# Patient Record
Sex: MALE | Race: WHITE | NOT HISPANIC OR LATINO | Employment: FULL TIME | URBAN - METROPOLITAN AREA
[De-identification: names, ages, dates, MRNs, and addresses within clinical notes are randomized per-mention and may not be internally consistent; named-entity substitution may affect disease eponyms.]

---

## 2017-03-03 ENCOUNTER — ALLSCRIPTS OFFICE VISIT (OUTPATIENT)
Dept: OTHER | Facility: OTHER | Age: 42
End: 2017-03-03

## 2017-03-09 ENCOUNTER — ALLSCRIPTS OFFICE VISIT (OUTPATIENT)
Dept: RADIOLOGY | Facility: CLINIC | Age: 42
End: 2017-03-09
Payer: COMMERCIAL

## 2017-03-21 ENCOUNTER — GENERIC CONVERSION - ENCOUNTER (OUTPATIENT)
Dept: OTHER | Facility: OTHER | Age: 42
End: 2017-03-21

## 2017-04-12 ENCOUNTER — ALLSCRIPTS OFFICE VISIT (OUTPATIENT)
Dept: OTHER | Facility: OTHER | Age: 42
End: 2017-04-12

## 2017-05-23 ENCOUNTER — ALLSCRIPTS OFFICE VISIT (OUTPATIENT)
Dept: RADIOLOGY | Facility: CLINIC | Age: 42
End: 2017-05-23
Payer: COMMERCIAL

## 2017-05-23 PROCEDURE — 77002 NEEDLE LOCALIZATION BY XRAY: CPT | Performed by: ANESTHESIOLOGY

## 2017-05-30 ENCOUNTER — GENERIC CONVERSION - ENCOUNTER (OUTPATIENT)
Dept: OTHER | Facility: OTHER | Age: 42
End: 2017-05-30

## 2018-01-11 NOTE — RESULT NOTES
Message   Recorded as Task   Date: 03/15/2017 12:59 PM, Created By: Chang Lemons   Task Name: Follow Up   Assigned To: Chang Lemons   Regarding Patient: Ynes Poe, Status: Active   Comment:    Mirlande Blanton - 15 Mar 2017 12:59 PM     TASK CREATED  F/u procedure-R L4-L5 MBB on 3/9/17  Pt reports 0% improvement in lower R back pain that radiates to R hip and knee  Describes his back pain as an ache and his R hip pain as a constant burning, but when he bends it changes to a stinging  Rates his pain as a 3-4/10  Unsure of next step  Has no f/u appoint     Ioana Rayo - 63 Mar 2017 1:54 PM     TASK REPLIED TO: Previously Assigned To Ioana Rayo  ok sched ov   Mirlande Blanton - 15 Mar 2017 2:00 PM     TASK REASSIGNED: Previously Assigned To Sara Berrios - 21 Mar 2017 10:26 AM     TASK REPLIED TO: Previously Assigned To Aditi sparksm to sched 4 wk inj f-up        Signatures   Electronically signed by : Sheri Sandoval, ; Mar 21 2017 11:24AM EST                       (Author)

## 2018-01-13 NOTE — RESULT NOTES
Message   Recorded as Task   Date: 07/08/2016 08:06 AM, Created By: Jessica Hodges   Task Name: Follow Up   Assigned To: Shiela Johnson   Regarding Patient: Syeda Santiago, Status: Active   Diana Welsh - 08 Jul 2016 8:06 AM     TASK CREATED  F/u procedure-SCS Trial on 7/7/16  I spoke to wife and she said her  is having a difficult time  Has alot of site discomfort, but still has burning back and leg pain that he had prior to procedure  Dressing is dry and intact  Is trying different programs  She will give him the message to call me back  Mirlande Blanton - 08 Jul 2016 12:03 PM     TASK REASSIGNED: Previously Assigned To Raheel Cantu - 08 Jul 2016 12:11 PM     TASK REASSIGNED: Previously Assigned To Brad Phillips  F/u procedure-SCS Trial on 7/7/16  Pt is going to meet Charmaine Pearson here today at 1:45  Is having difficult getting pain coverage  Still has low back and R hip burning that he rates a 5-6/10 along with vibrating abd muscles  No fever  Dressing is dry and intact  Has been trying different programs without good results  Is taking antibiotic as prescribes and has f/u appoint for stim removalRogers Shames - 08 Jul 2016 1:11 PM     TASK REPLIED TO: Previously Assigned To Nicole Ott MD AWARE        Verified Results  (1) APTT [id number] 02:25PM Val Files     Test Name Result Flag Reference   PARTIAL THROMBOPLASTIN$TIME, ACTIVATED 26 sec  22-34   This test has not been validated for monitoring  unfractionated heparin therapy  For testing that  is validated for this type of therapy, please refer  to the Heparin Anti-Xa assay (test code 61816)  For additional information, please refer to  http://education  Alliqua/faq/VGP712  (This link is being provided for   informational/educational purposes only )     (1) CBC/PLT/DIFF [id number] 02:25PM Val Files     Test Name Result Flag Reference   WHITE BLOOD CELL COUNT 7 1 Thousand/uL  3 8-10 8   RED BLOOD CELL COUNT 5 20 Million/uL  4 20-5 80   HEMOGLOBIN 15 1 g/dL  13 2-17 1   HEMATOCRIT 45 2 %  38 5-50 0   MCV 87 0 fL  80 0-100 0   MCH 29 1 pg  27 0-33 0   MCHC 33 5 g/dL  32 0-36 0   RDW 14 2 %  11 0-15 0   PLATELET COUNT 973 Thousand/uL  140-400   MPV 6 7 fL L 7 5-11 5   ABSOLUTE NEUTROPHILS 4381 cells/uL  5318-7320   ABSOLUTE LYMPHOCYTES 2173 cells/uL  850-3900   ABSOLUTE MONOCYTES 419 cells/uL  200-950   ABSOLUTE EOSINOPHILS 92 cells/uL     ABSOLUTE BASOPHILS 36 cells/uL  0-200   NEUTROPHILS 61 7 %     LYMPHOCYTES 30 6 %     MONOCYTES 5 9 %     EOSINOPHILS 1 3 %     BASOPHILS 0 5 %     WE RECEIVED YOUR HANDWRITTEN TEST ORDER AND  PERFORMED A CBC (INCLUDES HEMOGRAM, PLATELET  AND DIFFERENTIAL)  IF THIS IS NOT WHAT YOU  INTENDED TO ORDER, PLEASE CONTACT YOUR LOCAL   IMMEDIATELY SO   THAT WE CAN ADJUST OUR BILLING APPROPRIATELY  YOU MAY ALSO INQUIRE ABOUT ALTERNATIVE OR   ADDITIONAL TESTING  (1) COMPREHENSIVE METABOLIC PANEL [id number] 99:78WH Delmer Pae     Test Name Result Flag Reference   GLUCOSE 98 mg/dL  65-99   Fasting reference interval   UREA NITROGEN (BUN) 18 mg/dL  7-25   CREATININE 0 93 mg/dL  0 60-1 35   eGFR NON-AFR   AMERICAN 101 mL/min/1 73m2  > OR = 60   eGFR AFRICAN AMERICAN 117 mL/min/1 73m2  > OR = 60   BUN/CREATININE RATIO   9-04   NOT APPLICABLE (calc)   SODIUM 140 mmol/L  135-146   POTASSIUM 4 4 mmol/L  3 5-5 3   CHLORIDE 104 mmol/L     CARBON DIOXIDE 23 mmol/L  19-30   CALCIUM 9 8 mg/dL  8 6-10 3   PROTEIN, TOTAL 7 7 g/dL  6 1-8 1   ALBUMIN 4 8 g/dL  3 6-5 1   GLOBULIN 2 9 g/dL (calc)  1 9-3 7   ALBUMIN/GLOBULIN RATIO 1 7 (calc)  1 0-2 5   BILIRUBIN, TOTAL 0 4 mg/dL  0 2-1 2   ALKALINE PHOSPHATASE 108 U/L     AST 27 U/L  10-40   ALT 46 U/L  9-46     (1) PT WITH INR [id number] 02:25PM Delmer Pae   REPORT COMMENT:  FASTING:NO     Test Name Result Flag Reference   INR 1 0     Reference Range 0 9-1 1  Moderate-intensity Warfarin Therapy 2 0-3 0  Higher-intensity Warfarin Therapy   3 0-4 0   PT 10 2 sec  9 0-11 5   For more information on this test, go to:  http://In[employer]/faq/SAB951       Plan  Chronic lumbar radiculopathy, Lower back pain, Lumbar facet arthropathy    · XR SPINE THORACIC 2 VIEW; Status:Resulted - Requires Verification;   Done:  [id number] 03:17PM    Signatures   Electronically signed by : Jonnie Adames, ; Jul 8 2016  1:49PM EST                       (Author)

## 2018-01-14 VITALS
DIASTOLIC BLOOD PRESSURE: 90 MMHG | BODY MASS INDEX: 31.55 KG/M2 | HEIGHT: 69 IN | SYSTOLIC BLOOD PRESSURE: 138 MMHG | WEIGHT: 213 LBS | HEART RATE: 92 BPM

## 2018-01-14 VITALS
BODY MASS INDEX: 31.55 KG/M2 | SYSTOLIC BLOOD PRESSURE: 150 MMHG | HEART RATE: 92 BPM | DIASTOLIC BLOOD PRESSURE: 88 MMHG | WEIGHT: 213 LBS | HEIGHT: 69 IN

## 2018-01-17 NOTE — RESULT NOTES
Message   Recorded as Task   Date: 09/08/2016 02:53 PM, Created By: Allyssa Hammond   Task Name: Follow Up   Assigned To: Galdino Snowden   Regarding Patient: Aby Sheikh, Status: Active   CommentCarliss Barbara - 08 Sep 2016 2:53 PM     TASK CREATED  F/u procedure call completed  Pt had trigger point injection 9/1  States 25-30% relief since procedure  F/u 9/28     Mirlande Blanton - 08 Sep 2016 2:53 PM     TASK REASSIGNED: Previously Assigned To Alyssa Nuno - 08 Sep 2016 3:03 PM     TASK REPLIED TO: Previously Assigned To Porsha   Electronically signed by : Celestina Adkins, ; Sep  8 2016  3:07PM EST                       (Author)

## 2019-03-15 NOTE — RESULT NOTES
Message   Recorded as Task   Date: 05/30/2017 11:21 AM, Created By: Marilynn Brooks   Task Name: Follow Up   Assigned To: Marilynn Brooks   Regarding Patient: Junior Amezcua, Status: Active   Comment:    Cira Blantonesa - 30 May 2017 11:21 AM     TASK CREATED  F/u procedure-R piriformis injection on 5/23/17  It has been difficult for pt to determine if he got any relief, because he is being treated for a kidney stone  Has an appoint tomorrow with his urologist for a lithotripsy procedure  Will call after this procedure if pain is not better  Has no f/u appoint     Wandy Faith - 71 May 2017 12:05 PM     TASK REPLIED TO: Previously Assigned To Wandy narayanan md aware        Signatures   Electronically signed by : Susan Faustin, ; May 30 2017 12:26PM EST                       (Author) normal...

## 2021-02-25 ENCOUNTER — NURSE TRIAGE (OUTPATIENT)
Dept: OTHER | Facility: OTHER | Age: 46
End: 2021-02-25

## 2021-02-25 DIAGNOSIS — Z20.828 SARS-ASSOCIATED CORONAVIRUS EXPOSURE: Primary | ICD-10-CM

## 2021-02-25 NOTE — TELEPHONE ENCOUNTER
Reason for Disposition   [1] COVID-19 infection suspected by caller or triager AND [2] mild symptoms (cough, fever, or others) AND [5] no complications or SOB    Answer Assessment - Initial Assessment Questions  1  COVID-19 DIAGNOSIS: "Who made your Coronavirus (COVID-19) diagnosis?" "Was it confirmed by a positive lab test?" If not diagnosed by a HCP, ask "Are there lots of cases (community spread) where you live?" (See public health department website, if unsure)      community  2  COVID-19 EXPOSURE: "Was there any known exposure to COVID before the symptoms began?" [facility] Definition of close contact: within 6 feet (2 meters) for a total of 15 minutes or more over a 24-hour period  Wife tested positive yesterday, lives with wife  3  ONSET: "When did the COVID-19 symptoms start?"      3 days ago  4  WORST SYMPTOM: "What is your worst symptom?" (e g , cough, fever, shortness of breath, muscle aches)      headache  5  COUGH: "Do you have a cough?" If so, ask: "How bad is the cough?"        congestion  6  FEVER: "Do you have a fever?" If so, ask: "What is your temperature, how was it measured, and when did it start?"      denies  7  RESPIRATORY STATUS: "Describe your breathing?" (e g , shortness of breath, wheezing, unable to speak)       denies  8  BETTER-SAME-WORSE: "Are you getting better, staying the same or getting worse compared to yesterday?"  If getting worse, ask, "In what way?"      Staying the same  9  HIGH RISK DISEASE: "Do you have any chronic medical problems?" (e g , asthma, heart or lung disease, weak immune system, etc )      denies  10   OTHER SYMPTOMS: "Do you have any other symptoms?"  (e g , chills, fatigue, headache, loss of smell or taste, muscle pain, sore throat)       Sore throat, stuffy nose, nausea, chills and loss of taste    Protocols used: CORONAVIRUS (COVID-19) DIAGNOSED OR SUSPECTED-ADULT-OH
Regarding: Symptomatic COVID - loss of taste   ----- Message from Brii Quesada sent at 2/25/2021  3:52 PM EST -----  "I have COVID,  Patient now has symptoms of headache, chills, head cold, stuffy nose, cough, nausea, and loss of taste"
140

## 2021-02-26 DIAGNOSIS — Z20.828 SARS-ASSOCIATED CORONAVIRUS EXPOSURE: ICD-10-CM

## 2021-02-26 PROCEDURE — U0005 INFEC AGEN DETEC AMPLI PROBE: HCPCS | Performed by: FAMILY MEDICINE

## 2021-02-26 PROCEDURE — U0003 INFECTIOUS AGENT DETECTION BY NUCLEIC ACID (DNA OR RNA); SEVERE ACUTE RESPIRATORY SYNDROME CORONAVIRUS 2 (SARS-COV-2) (CORONAVIRUS DISEASE [COVID-19]), AMPLIFIED PROBE TECHNIQUE, MAKING USE OF HIGH THROUGHPUT TECHNOLOGIES AS DESCRIBED BY CMS-2020-01-R: HCPCS | Performed by: FAMILY MEDICINE

## 2021-02-27 LAB — SARS-COV-2 RNA RESP QL NAA+PROBE: NEGATIVE

## 2024-09-14 ENCOUNTER — APPOINTMENT (EMERGENCY)
Dept: RADIOLOGY | Facility: HOSPITAL | Age: 49
End: 2024-09-14
Payer: COMMERCIAL

## 2024-09-14 ENCOUNTER — HOSPITAL ENCOUNTER (EMERGENCY)
Facility: HOSPITAL | Age: 49
Discharge: HOME | End: 2024-09-14
Attending: EMERGENCY MEDICINE
Payer: COMMERCIAL

## 2024-09-14 VITALS
WEIGHT: 170.19 LBS | BODY MASS INDEX: 25.13 KG/M2 | DIASTOLIC BLOOD PRESSURE: 82 MMHG | SYSTOLIC BLOOD PRESSURE: 134 MMHG | HEART RATE: 112 BPM | TEMPERATURE: 97.9 F | RESPIRATION RATE: 18 BRPM | OXYGEN SATURATION: 98 %

## 2024-09-14 DIAGNOSIS — R00.0 TACHYCARDIA: ICD-10-CM

## 2024-09-14 DIAGNOSIS — R55 NEAR SYNCOPE: Primary | ICD-10-CM

## 2024-09-14 LAB
ALBUMIN SERPL BCG-MCNC: 4.8 G/DL (ref 3.5–5)
ALP SERPL-CCNC: 66 U/L (ref 34–104)
ALT SERPL W P-5'-P-CCNC: 10 U/L (ref 7–52)
ANION GAP SERPL CALCULATED.3IONS-SCNC: 9 MMOL/L (ref 4–13)
AST SERPL W P-5'-P-CCNC: 11 U/L (ref 13–39)
ATRIAL RATE: 103 BPM
BASOPHILS # BLD AUTO: 0.04 THOUSANDS/ΜL (ref 0–0.1)
BASOPHILS NFR BLD AUTO: 0 % (ref 0–1)
BILIRUB SERPL-MCNC: 0.57 MG/DL (ref 0.2–1)
BNP SERPL-MCNC: 12 PG/ML (ref 0–100)
BUN SERPL-MCNC: 20 MG/DL (ref 5–25)
CALCIUM SERPL-MCNC: 9.8 MG/DL (ref 8.4–10.2)
CARDIAC TROPONIN I PNL SERPL HS: <2 NG/L
CARDIAC TROPONIN I PNL SERPL HS: <2 NG/L
CHLORIDE SERPL-SCNC: 101 MMOL/L (ref 96–108)
CO2 SERPL-SCNC: 28 MMOL/L (ref 21–32)
CREAT SERPL-MCNC: 0.98 MG/DL (ref 0.6–1.3)
EOSINOPHIL # BLD AUTO: 0.12 THOUSAND/ΜL (ref 0–0.61)
EOSINOPHIL NFR BLD AUTO: 1 % (ref 0–6)
ERYTHROCYTE [DISTWIDTH] IN BLOOD BY AUTOMATED COUNT: 12.9 % (ref 11.6–15.1)
GFR SERPL CREATININE-BSD FRML MDRD: 89 ML/MIN/1.73SQ M
GLUCOSE SERPL-MCNC: 128 MG/DL (ref 65–140)
GLUCOSE SERPL-MCNC: 133 MG/DL (ref 65–140)
HCT VFR BLD AUTO: 41.5 % (ref 36.5–49.3)
HGB BLD-MCNC: 13.7 G/DL (ref 12–17)
IMM GRANULOCYTES # BLD AUTO: 0.03 THOUSAND/UL (ref 0–0.2)
IMM GRANULOCYTES NFR BLD AUTO: 0 % (ref 0–2)
LYMPHOCYTES # BLD AUTO: 1 THOUSANDS/ΜL (ref 0.6–4.47)
LYMPHOCYTES NFR BLD AUTO: 10 % (ref 14–44)
MCH RBC QN AUTO: 29.8 PG (ref 26.8–34.3)
MCHC RBC AUTO-ENTMCNC: 33 G/DL (ref 31.4–37.4)
MCV RBC AUTO: 90 FL (ref 82–98)
MONOCYTES # BLD AUTO: 0.8 THOUSAND/ΜL (ref 0.17–1.22)
MONOCYTES NFR BLD AUTO: 8 % (ref 4–12)
NEUTROPHILS # BLD AUTO: 7.97 THOUSANDS/ΜL (ref 1.85–7.62)
NEUTS SEG NFR BLD AUTO: 81 % (ref 43–75)
NRBC BLD AUTO-RTO: 0 /100 WBCS
P AXIS: 51 DEGREES
PLATELET # BLD AUTO: 200 THOUSANDS/UL (ref 149–390)
PMV BLD AUTO: 8 FL (ref 8.9–12.7)
POTASSIUM SERPL-SCNC: 3.6 MMOL/L (ref 3.5–5.3)
PR INTERVAL: 146 MS
PROT SERPL-MCNC: 7.5 G/DL (ref 6.4–8.4)
QRS AXIS: 13 DEGREES
QRSD INTERVAL: 70 MS
QT INTERVAL: 334 MS
QTC INTERVAL: 437 MS
RBC # BLD AUTO: 4.6 MILLION/UL (ref 3.88–5.62)
SODIUM SERPL-SCNC: 138 MMOL/L (ref 135–147)
T WAVE AXIS: 40 DEGREES
VENTRICULAR RATE: 103 BPM
WBC # BLD AUTO: 9.96 THOUSAND/UL (ref 4.31–10.16)

## 2024-09-14 PROCEDURE — 84484 ASSAY OF TROPONIN QUANT: CPT | Performed by: EMERGENCY MEDICINE

## 2024-09-14 PROCEDURE — 96365 THER/PROPH/DIAG IV INF INIT: CPT

## 2024-09-14 PROCEDURE — 99284 EMERGENCY DEPT VISIT MOD MDM: CPT

## 2024-09-14 PROCEDURE — 93005 ELECTROCARDIOGRAM TRACING: CPT

## 2024-09-14 PROCEDURE — 71045 X-RAY EXAM CHEST 1 VIEW: CPT

## 2024-09-14 PROCEDURE — 85025 COMPLETE CBC W/AUTO DIFF WBC: CPT | Performed by: EMERGENCY MEDICINE

## 2024-09-14 PROCEDURE — 99285 EMERGENCY DEPT VISIT HI MDM: CPT | Performed by: EMERGENCY MEDICINE

## 2024-09-14 PROCEDURE — 93010 ELECTROCARDIOGRAM REPORT: CPT | Performed by: INTERNAL MEDICINE

## 2024-09-14 PROCEDURE — 80053 COMPREHEN METABOLIC PANEL: CPT | Performed by: EMERGENCY MEDICINE

## 2024-09-14 PROCEDURE — 83880 ASSAY OF NATRIURETIC PEPTIDE: CPT | Performed by: EMERGENCY MEDICINE

## 2024-09-14 PROCEDURE — 96366 THER/PROPH/DIAG IV INF ADDON: CPT

## 2024-09-14 PROCEDURE — 36415 COLL VENOUS BLD VENIPUNCTURE: CPT | Performed by: EMERGENCY MEDICINE

## 2024-09-14 PROCEDURE — 82948 REAGENT STRIP/BLOOD GLUCOSE: CPT

## 2024-09-14 RX ORDER — ASPIRIN 81 MG/1
324 TABLET, CHEWABLE ORAL ONCE
Status: COMPLETED | OUTPATIENT
Start: 2024-09-14 | End: 2024-09-14

## 2024-09-14 RX ADMIN — SODIUM CHLORIDE, SODIUM LACTATE, POTASSIUM CHLORIDE, AND CALCIUM CHLORIDE 1000 ML: .6; .31; .03; .02 INJECTION, SOLUTION INTRAVENOUS at 10:14

## 2024-09-14 RX ADMIN — ASPIRIN 81 MG 324 MG: 81 TABLET ORAL at 10:11

## 2024-09-14 NOTE — ED PROVIDER NOTES
1. Near syncope    2. Tachycardia      ED Disposition       ED Disposition   Discharge    Condition   Stable    Date/Time   Sat Sep 14, 2024  1:01 PM    Comment   Mazin Figueroa discharge to home/self care.                   Assessment & Plan             Medical Decision Making  Amount and/or Complexity of Data Reviewed  Labs: ordered.  Radiology: ordered and independent interpretation performed.    Risk  OTC drugs.      49-year-old male with episode of near syncope.  Vital signs reassuring, other than elevated heart rate which patient states this is chronic baseline, examination reassuring.  EKG not acutely ischemic, no significant ectopy or concerning arrhythmias noted on telemetry monitoring throughout stay.  Negative serial troponins.  Do not suspect ACS, PE, aortic dissection, bacterial pneumonia, pneumothorax, other acute life threat.  Unable to completely rule out malignant arrhythmia based on lack of cardiac monitoring at time of event, did discuss this in some detail with the patient.  Based on provided history, I suspect the most likely etiology of this episode is a vasovagal event potentiated by dehydration from activities the day before, although transient hypotension due to blood pressure medications in the setting of dehydration also considered.  Blood pressure reassuring throughout ER stay.  At this time, patient is felt appropriate for close outpatient follow-up, he was offered admission but prefers to travel back home in [place] on his scheduled flight today, states he will follow-up with cardiology and primary care doctor.  Hemodynamically stable and comfortable time of discharge.          EKG Interpretation    Rate:  103  BPM  Rhythm:  Normal Sinus Rhythm   Axis:  Normal   Intervals: Normal, no blocks, QTc  437 ms  Q waves:  No pathologic Q waves   T waves:  Normal   ST segments:  No significant elevations or depressions     Impression:  Normal sinus rhythm without evidence of acute  ischemia or significant arrhythmia      EKG for comparison: None available    EKG interpreted by me.     HEART score:    History 0=Slightly or non-suspicious   ECG 0=Normal   Age 1= > 45 - <65 years   Risk Factors 2= > 3 risk factors, or history of atherosclerotic disease   Troponin 0= Less than or equal to 12 ng/L   Total 3               Medications   lactated ringers bolus 1,000 mL (0 mL Intravenous Stopped 9/14/24 1333)   aspirin chewable tablet 324 mg (324 mg Oral Given 9/14/24 1011)       History of Present Illness         Dizziness  Associated symptoms: no chest pain, no diarrhea, no headaches, no nausea, no shortness of breath, no vomiting and no weakness        49-year-old man presenting with  episode of lightheadedness.  Patient states that he was sitting at breakfast with his family, sipping on coffee when he suddenly felt nauseous, pale, sweaty, lightheaded, felt like he might pass out.  He remained seated, did not lose consciousness, and remained seated this feeling passed after approximately 2 minutes and he returned to his normal self.  He denies chest pain, shortness of breath, numbness, weakness, trauma, fevers, other symptoms.  Patient states that he was out all day yesterday to feel the event, did not drink much water, did have 1 beer and part of another.  He notes that he did not sleep last night, only slept about 2 hours because he was nervous about his upcoming flight today.  Patient notes that he previously had very high blood pressure,  had a stroke attributed to same over the past year, has since lost 70 pounds,  has had several episodes of low blood pressure that felt very similar to todays episode  Although he has not had a similar episode since his blood pressure medication was reduced several months ago.    Patient also states that he typically has a high resting heart rate, typically in the high 90s, 95-98 when he checks his blood pressure in the mornings states his heart rate is  typically in the 110s to 120s when he sees his doctor.    Review of Systems   Constitutional:  Positive for diaphoresis. Negative for appetite change and chills.   HENT:  Negative for drooling, facial swelling, trouble swallowing and voice change.    Respiratory:  Negative for apnea, shortness of breath and wheezing.    Cardiovascular:  Negative for chest pain and leg swelling.   Gastrointestinal:  Negative for abdominal distention, abdominal pain, diarrhea, nausea and vomiting.   Genitourinary:  Negative for dysuria and urgency.   Musculoskeletal:  Negative for arthralgias, back pain, gait problem and neck pain.   Skin:  Negative for color change, rash and wound.   Neurological:  Positive for dizziness and light-headedness. Negative for seizures, speech difficulty, weakness and headaches.   Psychiatric/Behavioral:  Negative for agitation, behavioral problems and dysphoric mood. The patient is not nervous/anxious.            Objective     ED Triage Vitals [09/14/24 0938]   Temperature Pulse Blood Pressure Respirations SpO2 Patient Position - Orthostatic VS   97.9 °F (36.6 °C) (!) 109 136/83 20 98 % Sitting      Temp Source Heart Rate Source BP Location FiO2 (%) Pain Score    Oral Monitor Right arm -- --        Physical Exam  Vitals and nursing note reviewed.   Constitutional:       General: He is not in acute distress.     Appearance: He is well-developed. He is not ill-appearing, toxic-appearing or diaphoretic.   HENT:      Head: Normocephalic and atraumatic.      Right Ear: External ear normal.      Left Ear: External ear normal.      Nose: Nose normal. No congestion or rhinorrhea.      Mouth/Throat:      Mouth: Mucous membranes are dry.      Pharynx: Oropharynx is clear. No oropharyngeal exudate or posterior oropharyngeal erythema.   Eyes:      Conjunctiva/sclera: Conjunctivae normal.      Pupils: Pupils are equal, round, and reactive to light.   Neck:      Trachea: No tracheal deviation.   Cardiovascular:       Rate and Rhythm: Normal rate and regular rhythm.      Pulses: Normal pulses.      Heart sounds: Normal heart sounds. No murmur heard.  Pulmonary:      Effort: Pulmonary effort is normal. No respiratory distress.      Breath sounds: Normal breath sounds. No stridor. No wheezing or rales.   Abdominal:      General: There is no distension.      Palpations: Abdomen is soft.      Tenderness: There is no abdominal tenderness. There is no guarding or rebound.   Musculoskeletal:         General: No deformity. Normal range of motion.      Cervical back: Normal range of motion and neck supple. No rigidity or tenderness.      Right lower leg: No edema.      Left lower leg: No edema.   Skin:     General: Skin is warm and dry.      Capillary Refill: Capillary refill takes less than 2 seconds.      Findings: No rash.   Neurological:      Mental Status: He is alert and oriented to person, place, and time.      Cranial Nerves: No cranial nerve deficit.      Motor: Weakness present.      Coordination: Coordination normal.      Gait: Gait normal.      Comments: 4 out of 5 strength in left upper and lower extremities at chronic baseline per patient, cranial nerves II through XII intact, ambulating with normal steady gait without difficulty or assistance.   Psychiatric:         Behavior: Behavior normal.         Thought Content: Thought content normal.         Judgment: Judgment normal.         Labs Reviewed   CBC AND DIFFERENTIAL - Abnormal       Result Value    WBC 9.96      RBC 4.60      Hemoglobin 13.7      Hematocrit 41.5      MCV 90      MCH 29.8      MCHC 33.0      RDW 12.9      MPV 8.0 (*)     Platelets 200      nRBC 0      Segmented % 81 (*)     Immature Grans % 0      Lymphocytes % 10 (*)     Monocytes % 8      Eosinophils Relative 1      Basophils Relative 0      Absolute Neutrophils 7.97 (*)     Absolute Immature Grans 0.03      Absolute Lymphocytes 1.00      Absolute Monocytes 0.80      Eosinophils Absolute 0.12       Basophils Absolute 0.04     COMPREHENSIVE METABOLIC PANEL - Abnormal    Sodium 138      Potassium 3.6      Chloride 101      CO2 28      ANION GAP 9      BUN 20      Creatinine 0.98      Glucose 128      Calcium 9.8      AST 11 (*)     ALT 10      Alkaline Phosphatase 66      Total Protein 7.5      Albumin 4.8      Total Bilirubin 0.57      eGFR 89      Narrative:     National Kid[employer] Disease Foundation guidelines for Chronic Kidney Disease (CKD):     Stage 1 with normal or high GFR (GFR > 90 mL/min/1.73 square meters)    Stage 2 Mild CKD (GFR = 60-89 mL/min/1.73 square meters)    Stage 3A Moderate CKD (GFR = 45-59 mL/min/1.73 square meters)    Stage 3B Moderate CKD (GFR = 30-44 mL/min/1.73 square meters)    Stage 4 Severe CKD (GFR = 15-29 mL/min/1.73 square meters)    Stage 5 End Stage CKD (GFR <15 mL/min/1.73 square meters)  Note: GFR calculation is accurate only with a steady state creatinine   B-TYPE NATRIURETIC PEPTIDE (BNP) - Normal    BNP 12     HS TROPONIN I 0HR - Normal    hs TnI 0hr <2     POCT GLUCOSE - Normal    POC Glucose 133     HS TROPONIN I 2HR    hs TnI 2hr <2      Delta 2hr hsTnI         XR chest 1 view portable   ED Interpretation by Gary Rosario MD (09/14 3828)   No acute cardiopulmonary process or osseous abnormality.                  Procedures       Gary Rosario MD  09/14/24 9814

## 2024-09-20 NOTE — ED ATTENDING ATTESTATION
9/14/2024  I, Gary Rosario MD, saw and evaluated the patient. I have discussed the patient with the resident/non-physician practitioner and agree with the resident's/non-physician practitioner's findings, Plan of Care, and MDM as documented in the resident's/non-physician practitioner's note, except where noted. All available labs and Radiology studies were reviewed.  I was present for key portions of any procedure(s) performed by the resident/non-physician practitioner and I was immediately available to provide assistance.       At this point I agree with the current assessment done in the Emergency Department.  I have conducted an independent evaluation of this patient a history and physical is as follows:    Briefly, 49-year-old male presenting with episode of lightheadedness.  Patient states that he had been out all day at a field that yesterday, had been in the sun, had had 1 beer and part of another very little water over that time.  He also notes that he is scheduled to fly back home today, is typically very nervous about flights and as result slept approximately 2 hours.  Patient was drinking coffee and seated at a table with his family this morning when he suddenly felt significantly lightheaded, sweaty and nauseous, was described as pale and sweaty by his family.  He did not lose consciousness, states that the feeling passed after about 2 minutes and returned to his normal self.  No chest pain, shortness of breath, trauma, fever, numbness, weakness, other symptoms.    On examination, patient noted to be tachycardic, cranial nerves II to XII intact, 5 out of 5 strength in all extremities, normal speech and coordination.  No swelling the legs.  Heart sounds normal, lungs clear to auscultation, nonfocal neurologic exam     Laboratory investigations overall reassuring, workup overall unremarkable other than tachycardia.  Patient states that this is his typical baseline, states his resting heart rate is  typically 95-98 immediately after waking up, typically in the 110s when he sees a doctor or exerts himself at all.  Patient declines further workup for same.  We did discuss possibility of rapid A-fib, SVT, or potentially malignant arrhythmia, patient understands that possibility but wishes to be discharged to return home and follow-up with his cardiologist in his home state.  Low suspicion for ACS, PE, aortic dissection, sepsis, other acute life threat.  Treated symptomatically, discharged with strict return precautions, follow-up with cardiology and primary care doctor.  ED Course         Critical Care Time  Procedures